# Patient Record
Sex: MALE | Race: WHITE
[De-identification: names, ages, dates, MRNs, and addresses within clinical notes are randomized per-mention and may not be internally consistent; named-entity substitution may affect disease eponyms.]

---

## 2020-10-11 ENCOUNTER — HOSPITAL ENCOUNTER (EMERGENCY)
Dept: HOSPITAL 96 - M.ERS | Age: 25
Discharge: HOME | End: 2020-10-11
Payer: COMMERCIAL

## 2020-10-11 VITALS — WEIGHT: 170 LBS | BODY MASS INDEX: 22.53 KG/M2 | HEIGHT: 73 IN

## 2020-10-11 VITALS — SYSTOLIC BLOOD PRESSURE: 116 MMHG | DIASTOLIC BLOOD PRESSURE: 59 MMHG
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
## 2020-10-11 DIAGNOSIS — S29.011A: Primary | ICD-10-CM

## 2020-10-11 DIAGNOSIS — Z88.6: ICD-10-CM

## 2020-10-11 DIAGNOSIS — J45.909: ICD-10-CM

## 2020-10-11 DIAGNOSIS — F17.210: ICD-10-CM
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
## 2020-10-11 DIAGNOSIS — S29.019A: ICD-10-CM

## 2020-10-11 DIAGNOSIS — Z88.0: ICD-10-CM

## 2020-10-11 LAB
ABSOLUTE BASOPHILS: 0.1 THOU/UL (ref 0–0.2)
ABSOLUTE EOSINOPHILS: 0.2 THOU/UL (ref 0–0.7)
ABSOLUTE MONOCYTES: 0.9 THOU/UL (ref 0–1.2)
ALBUMIN SERPL-MCNC: 4.2 G/DL (ref 3.4–5)
ALP SERPL-CCNC: 86 U/L (ref 46–116)
ALT SERPL-CCNC: 25 U/L (ref 30–65)
ANION GAP SERPL CALC-SCNC: 7 MMOL/L (ref 7–16)
APTT BLD: 25.5 SECONDS (ref 25–31.3)
AST SERPL-CCNC: 22 U/L (ref 15–37)
BASOPHILS NFR BLD AUTO: 1.3 %
BILIRUB SERPL-MCNC: 0.3 MG/DL
BUN SERPL-MCNC: 17 MG/DL (ref 7–18)
CALCIUM SERPL-MCNC: 8.9 MG/DL (ref 8.5–10.1)
CHLORIDE SERPL-SCNC: 101 MMOL/L (ref 98–107)
CO2 SERPL-SCNC: 31 MMOL/L (ref 21–32)
CREAT SERPL-MCNC: 1.4 MG/DL (ref 0.6–1.3)
EOSINOPHIL NFR BLD: 1.7 %
GLUCOSE SERPL-MCNC: 130 MG/DL (ref 70–99)
GRANULOCYTES NFR BLD MANUAL: 65 %
HCT VFR BLD CALC: 44 % (ref 42–52)
HGB BLD-MCNC: 15 GM/DL (ref 14–18)
INR PPP: 1.1
LYMPHOCYTES # BLD: 2.3 THOU/UL (ref 0.8–5.3)
LYMPHOCYTES NFR BLD AUTO: 23.1 %
MAGNESIUM SERPL-MCNC: 2.1 MG/DL (ref 1.8–2.4)
MCH RBC QN AUTO: 30.8 PG (ref 26–34)
MCHC RBC AUTO-ENTMCNC: 34.1 G/DL (ref 28–37)
MCV RBC: 90.5 FL (ref 80–100)
MONOCYTES NFR BLD: 8.9 %
MPV: 7.5 FL. (ref 7.2–11.1)
NEUTROPHILS # BLD: 6.4 THOU/UL (ref 1.6–8.1)
NT-PRO BRAIN NAT PEPTIDE: 16 PG/ML (ref ?–300)
NUCLEATED RBCS: 0 /100WBC
PLATELET COUNT*: 257 THOU/UL (ref 150–400)
POTASSIUM SERPL-SCNC: 3.5 MMOL/L (ref 3.5–5.1)
PROT SERPL-MCNC: 8 G/DL (ref 6.4–8.2)
PROTHROMBIN TIME: 11 SECONDS (ref 9.2–11.5)
RBC # BLD AUTO: 4.86 MIL/UL (ref 4.5–6)
RDW-CV: 13.5 % (ref 10.5–14.5)
SODIUM SERPL-SCNC: 139 MMOL/L (ref 136–145)
WBC # BLD AUTO: 9.9 THOU/UL (ref 4–11)

## 2020-10-12 NOTE — EKG
[place] [address]
Phone:  [phone]                     ELECTROCARDIOGRAM REPORT      
_______________________________________________________________________________
 
Name:         TAYLA RASHEED              Room:                     [facility]#:    [id number]     Account #:     [id number]  
Admission:    10/11/20    Attend Phys:                     
Discharge:    10/11/20    Date of Birth: [birth year]  
Date of Service: 10/11/20 1602  Report #:      [id number]
        [id number]
_______________________________________________________________________________
THIS REPORT FOR:  //name//                      
 
                         [facility] ED
                                       
Test Date:    2020-10-11               Test Time:    16:02:27
Pat Name:     TAYLA RASHEED           Department:   
Patient ID:   [id number]            Room:          
Gender:                               Technician:   
:          1995               Requested By: Leah Andrade
Order Number: [id number] MD:   Aung Andrade
                                 Measurements
Intervals                              Axis          
Rate:         88                       P:            73
NV:           136                      QRS:          55
QRSD:         112                      T:            53
QT:           366                                    
QTc:          443                                    
                           Interpretive Statements
Sinus rhythm
Borderline intraventricular conduction delay
Baseline wander in lead(s) V3
No previous ECG available for comparison
javascript:perform('study_confirm');
Electronically Signed On 10- 18:26:23 CDT by Aung Andrade
[email or web address]
 
 
 
 
 
 
 
 
 
 
 
 
 
 
 
 
 
 
 
  <ELECTRONICALLY SIGNED>
                                           By: Aung Andrade MD, FACC   
  10/12/20     1826
D: 10/11/20 160   _____________________________________
T: 10/11/20 160   Aung Andrade MD, FAC     /EPI